# Patient Record
Sex: FEMALE | Race: BLACK OR AFRICAN AMERICAN | NOT HISPANIC OR LATINO | Employment: FULL TIME | URBAN - METROPOLITAN AREA
[De-identification: names, ages, dates, MRNs, and addresses within clinical notes are randomized per-mention and may not be internally consistent; named-entity substitution may affect disease eponyms.]

---

## 2017-01-17 ENCOUNTER — HOSPITAL ENCOUNTER (EMERGENCY)
Facility: HOSPITAL | Age: 42
Discharge: HOME | End: 2017-01-17
Attending: EMERGENCY MEDICINE
Payer: MEDICAID

## 2017-01-17 VITALS
OXYGEN SATURATION: 97 % | DIASTOLIC BLOOD PRESSURE: 79 MMHG | TEMPERATURE: 99 F | RESPIRATION RATE: 18 BRPM | HEIGHT: 59 IN | SYSTOLIC BLOOD PRESSURE: 113 MMHG | HEART RATE: 100 BPM | WEIGHT: 145 LBS | BODY MASS INDEX: 29.23 KG/M2

## 2017-01-17 DIAGNOSIS — J06.9 ACUTE URI: Primary | ICD-10-CM

## 2017-01-17 DIAGNOSIS — N39.0 ACUTE UTI: ICD-10-CM

## 2017-01-17 DIAGNOSIS — R50.9 ACUTE FEBRILE ILLNESS: ICD-10-CM

## 2017-01-17 DIAGNOSIS — R06.00 DYSPNEA: ICD-10-CM

## 2017-01-17 LAB
B-HCG UR QL: NEGATIVE
BACTERIA #/AREA URNS HPF: ABNORMAL /HPF
BILIRUB UR QL STRIP: NEGATIVE
CLARITY UR: CLEAR
COLOR UR: YELLOW
CTP QC/QA: YES
FLUAV AG SPEC QL IA: NEGATIVE
FLUBV AG SPEC QL IA: NEGATIVE
GLUCOSE UR QL STRIP: NEGATIVE
HGB UR QL STRIP: ABNORMAL
KETONES UR QL STRIP: ABNORMAL
LEUKOCYTE ESTERASE UR QL STRIP: NEGATIVE
MICROSCOPIC COMMENT: ABNORMAL
NITRITE UR QL STRIP: NEGATIVE
NON-SQ EPI CELLS #/AREA URNS HPF: 1 /HPF
PH UR STRIP: 6 [PH] (ref 5–8)
PROT UR QL STRIP: NEGATIVE
RBC #/AREA URNS HPF: 1 /HPF (ref 0–4)
SP GR UR STRIP: 1.02 (ref 1–1.03)
SPECIMEN SOURCE: NORMAL
SQUAMOUS #/AREA URNS HPF: 8 /HPF
URN SPEC COLLECT METH UR: ABNORMAL
UROBILINOGEN UR STRIP-ACNC: NEGATIVE EU/DL
YEAST URNS QL MICRO: ABNORMAL

## 2017-01-17 PROCEDURE — 87400 INFLUENZA A/B EACH AG IA: CPT | Mod: 59

## 2017-01-17 PROCEDURE — 81025 URINE PREGNANCY TEST: CPT | Performed by: EMERGENCY MEDICINE

## 2017-01-17 PROCEDURE — 81000 URINALYSIS NONAUTO W/SCOPE: CPT

## 2017-01-17 PROCEDURE — 25000003 PHARM REV CODE 250: Performed by: EMERGENCY MEDICINE

## 2017-01-17 PROCEDURE — 99284 EMERGENCY DEPT VISIT MOD MDM: CPT | Mod: 25

## 2017-01-17 RX ORDER — ACETAMINOPHEN 500 MG
1000 TABLET ORAL
Status: COMPLETED | OUTPATIENT
Start: 2017-01-17 | End: 2017-01-17

## 2017-01-17 RX ORDER — AMOXICILLIN AND CLAVULANATE POTASSIUM 875; 125 MG/1; MG/1
1 TABLET, FILM COATED ORAL 2 TIMES DAILY
Qty: 20 TABLET | Refills: 0 | Status: SHIPPED | OUTPATIENT
Start: 2017-01-17 | End: 2017-01-27

## 2017-01-17 RX ORDER — LEVETIRACETAM 250 MG/1
500 TABLET ORAL 2 TIMES DAILY
COMMUNITY

## 2017-01-17 RX ORDER — IBUPROFEN 600 MG/1
600 TABLET ORAL
Status: COMPLETED | OUTPATIENT
Start: 2017-01-17 | End: 2017-01-17

## 2017-01-17 RX ORDER — MELOXICAM 7.5 MG/1
7.5 TABLET ORAL 2 TIMES DAILY PRN
Qty: 20 TABLET | Refills: 0 | Status: SHIPPED | OUTPATIENT
Start: 2017-01-17 | End: 2023-09-18 | Stop reason: ALTCHOICE

## 2017-01-17 RX ORDER — METFORMIN HYDROCHLORIDE 500 MG/1
500 TABLET, FILM COATED, EXTENDED RELEASE ORAL
COMMUNITY

## 2017-01-17 RX ADMIN — IBUPROFEN 600 MG: 600 TABLET, FILM COATED ORAL at 08:01

## 2017-01-17 RX ADMIN — ACETAMINOPHEN 1000 MG: 500 TABLET ORAL at 08:01

## 2017-01-17 NOTE — ED AVS SNAPSHOT
[facility]  [place] [address]               Velmanuellance Cunningham   2017  7:23 PM   ED    Description:  Female : 1975   Department:  [facility]           Your Care was Coordinated By:     Provider Role From To    Fidencio Humphries MD Attending Provider [phone] --      Reason for Visit     Generalized Body Aches           Diagnoses this Visit        Comments    Acute URI    -  Primary     Dyspnea         Acute febrile illness         Acute UTI           ED Disposition     ED Disposition Condition Comment    Discharge             To Do List           Follow-up Information     Schedule an appointment as soon as possible for a visit with Primary care physician/[facility].       These Medications        Disp Refills Start End    meloxicam (MOBIC) 7.5 MG tablet 20 tablet 0 2017     Take 1 tablet (7.5 mg total) by mouth 2 (two) times daily as needed for Pain. - Oral    amoxicillin-clavulanate 875-125mg (AUGMENTIN) 875-125 mg per tablet 20 tablet 0 2017    Take 1 tablet by mouth 2 (two) times daily. - Oral      [facility]     [facility] -  Assistance  Registered nurses in the [facility] provide clinical advisement, health education, appointment booking, and other advisory services.  Call for this free service at [phone].             Medications           Message regarding Medications     Verify the changes and/or additions to your medication regime listed below are the same as discussed with your clinician today.  If any of these changes or additions are incorrect, please notify your healthcare provider.        START taking these NEW medications        Refills    meloxicam (MOBIC) 7.5 MG tablet 0    Sig: Take 1 tablet (7.5 mg total) by mouth 2 (two) times daily as needed for Pain.    Class: Print    Route: Oral    amoxicillin-clavulanate 875-125mg  "(AUGMENTIN) 875-125 mg per tablet 0    Sig: Take 1 tablet by mouth 2 (two) times daily.    Class: Print    Route: Oral      These medications were administered today        Dose Freq    acetaminophen tablet 1,000 mg 1,000 mg ED 1 Time    Sig: Take 2 tablets (1,000 mg total) by mouth ED 1 Time.    Class: Normal    Route: Oral    ibuprofen tablet 600 mg 600 mg ED 1 Time    Sig: Take 1 tablet (600 mg total) by mouth ED 1 Time.    Class: Normal    Route: Oral           Verify that the below list of medications is an accurate representation of the medications you are currently taking.  If none reported, the list may be blank. If incorrect, please contact your healthcare provider. Carry this list with you in case of emergency.           Current Medications     levetiracetam (KEPPRA) 250 MG Tab Take 500 mg by mouth 2 (two) times daily.    metformin (GLUMETZA) 500 MG (MOD) 24 hr tablet Take 500 mg by mouth daily with breakfast.    TOPIRAMATE (TOPAMAX ORAL) Take by mouth.    amoxicillin-clavulanate 875-125mg (AUGMENTIN) 875-125 mg per tablet Take 1 tablet by mouth 2 (two) times daily.    meloxicam (MOBIC) 7.5 MG tablet Take 1 tablet (7.5 mg total) by mouth 2 (two) times daily as needed for Pain.           Clinical Reference Information           Your Vitals Were     BP Pulse Temp Resp Height Weight    140/84 (BP Location: Left arm, Patient Position: Sitting) 112 100.4 °F (38 °C) (Oral) 18 4' 11" (1.499 m) 65.8 kg (145 lb)    Last Period SpO2 BMI          12/24/2016 (Exact Date) 99% 29.29 kg/m2        Allergies as of 1/17/2017     No Known Allergies      Immunizations Administered on Date of Encounter - 1/17/2017     None      ED Micro, Lab, POCT     Start Ordered       Status Ordering Provider    01/17/17 1946 01/17/17 1945  Influenza antigen Nasopharyngeal Swab  STAT      Final result     01/17/17 1946 01/17/17 1945    Once,   Status:  Canceled      Canceled     01/17/17 1946 01/17/17 1945  Urinalysis  STAT      Final " result     01/17/17 1946 01/17/17 1945  POCT urine pregnancy  Once      Final result     01/17/17 1945 01/17/17 1945  Urinalysis Microscopic  Once      Final result       ED Imaging Orders     Start Ordered       Status Ordering Provider    01/17/17 1946 01/17/17 1945  X-Ray Chest PA And Lateral  1 time imaging      Final result       Discharge References/Attachments     BLADDER INFECTION, FEMALE (ADULT) (ENGLISH)    URI, VIRAL, NO ABX (ADULT) (ENGLISH)    FEVER CONTROL (ADULT) (ENGLISH)      [facility] Sign-Up     Activating your [facility] account is as easy as 1-2-3!     1) Visit [email or web address], select Sign Up Now, enter this activation code and your date of birth, then select Next.  [email or web address]  Expires: 3/3/2017  9:45 PM      2) Create a username and password to use when you visit [facility] in the future and select a security question in case you lose your password and select Next.    3) Enter your e-mail address and click Sign Up!    Additional Information  If you have questions, please e-mail [email or web address] or call [phone] to talk to our [facility] staff. Remember, [facility] is NOT to be used for urgent needs. For medical emergencies, dial 911.          [facility] complies with applicable Federal civil rights laws and does not discriminate on the basis of race, color, national origin, age, disability, or sex.        Language Assistance Services     ATTENTION: Language assistance services are available, free of charge. Please call [phone].      ATENCIÓN: Si habla español, tiene a coello disposición servicios gratuitos de asistencia lingüística. Llame al [phone].     CHÚ Ý: N?u b?n nói Ti?ng Vi?t, có các d?ch v? h? tr? ngôn ng? mi?n phí dành cho b?n. G?i s? [phone].

## 2017-01-18 NOTE — ED TRIAGE NOTES
Pt with c/o frontal headache that began 2 days ago, body aches and chills that began this morning. Pt denies visual changes, denies dizziness. Pt also with c/o intermittent SOB.

## 2017-01-18 NOTE — ED PROVIDER NOTES
"Encounter Date: 1/17/2017    SCRIBE #1 NOTE: I, Reno Rowland, am scribing for, and in the presence of,  Fidencio Humphries MD. I have scribed the following portions of the note - Other sections scribed: HPI, ROS.       History     Chief Complaint   Patient presents with    Generalized Body Aches     "I don't know if I have the flu or a sinus infection or what. My whole body is hurting me and my head is hurting."     Review of patient's allergies indicates:  No Known Allergies  HPI Comments: CC: Generalized Body Aches    HPI: This 41 y.o female with a PMHx of DM presents to the ED c/o acute onset generalized body aches that started this morning. Pt also c/o 2 day hx of sinus pressure,  frontal headaches, rhinorrhea, congestion, and urinary frequency, a productive cough (yellow mucus), SOB. Pt has not received the flu vaccination this year. Pt denies sick contacts, chills, difficulty urinating.  No other symptoms reported.     The history is provided by the patient.     Past Medical History   Diagnosis Date    Diabetes mellitus     Epilepsy     Seizures      No past medical history pertinent negatives.  History reviewed. No pertinent past surgical history.  Family History   Problem Relation Age of Onset    Diabetes Mother     Diabetes Father      Social History   Substance Use Topics    Smoking status: Never Smoker    Smokeless tobacco: None    Alcohol use No     Review of Systems   Constitutional: Negative for chills and fever.   HENT: Positive for congestion, rhinorrhea and sinus pressure. Negative for sore throat.    Eyes: Negative for redness.   Respiratory: Positive for shortness of breath.    Cardiovascular: Negative for chest pain.   Gastrointestinal: Negative for diarrhea, nausea and vomiting.   Genitourinary: Positive for frequency. Negative for difficulty urinating and dysuria.   Musculoskeletal:        (+) generalized body aches   Skin: Negative for rash.   Neurological: Positive for headaches " (frontal). Negative for weakness.   Hematological: Does not bruise/bleed easily.       Physical Exam   Initial Vitals   BP Pulse Resp Temp SpO2   01/17/17 1920 01/17/17 1920 01/17/17 1920 01/17/17 1920 01/17/17 1920   140/84 112 18 100.4 °F (38 °C) 99 %     Physical Exam  Nursing note and vitals reviewed.  Constitutional: Nontoxic appearing female in no acute distress.  HENT:    Head: NC/AT    Eyes: Conjunctivae normal.  (-) scleral icterus.              Mouth/Throat: Erythematous oropharynx w/o exudates.  Uvula midline.   Neck: Neck supple, normal rom.  (-)stridor.  (-) cervical lymphadenopathy.   Cardiovascular: tachycardic, regular rhythm  Pulmonary/Chest: CTAB   Musculoskeletal: FROM of all major joints. No LE edema or calf tenderness.  Neurological: A&O x4.  No motor or sensory deficits.  Normal gait.  Skin: Skin is intact, warm and dry.   No rash, petechiae or purpura.  Psychiatric: normal mood and affect.      ED Course   Procedures  Labs Reviewed   URINALYSIS - Abnormal; Notable for the following:        Result Value    Ketones, UA Trace (*)     Occult Blood UA 1+ (*)     All other components within normal limits   URINALYSIS MICROSCOPIC - Abnormal; Notable for the following:     Bacteria, UA Moderate (*)     Yeast, UA Rare (*)     Non-Squam Epith 1 (*)     All other components within normal limits   INFLUENZA A AND B ANTIGEN   POCT URINE PREGNANCY        X-Rays:   Independently Interpreted Readings:   Chest X-Ray: Normal heart size.  No infiltrates.  No acute abnormalities.     Medical Decision Making:   History:   Old Medical Records: I decided to obtain old medical records.  Old Records Summarized: records from clinic visits and other records.  Independently Interpreted Test(s):   I have ordered and independently interpreted X-rays - see prior notes.  Clinical Tests:   Lab Tests: Ordered and Reviewed  Radiological Study: Ordered and Reviewed    Differential diagnosis:   Initial differential diagnosis  includes but is not limited to... URI, viral syndrome, pneumonia, influenza    Additional Medical Decision Makin-year-old female with history of diabetes presents emergency Department complaining of generalized body aches, sinus pressure/congestion, rhinorrhea, frontal headaches, productive cough and urinary frequency for the past 2 days.  VS significant for low-grade fever 100.4°F and mild tachycardia 112.  Chest x-ray reveals clear lung fields without obvious consolidation/pneumonia, pleural effusion or pneumothorax.  Rapid flu negative.  Urinalysis reveals moderate bacteria and rare yeast.  Findings at this time are most consistent with an acute URI/viral syndrome along with an incidental finding of an acute UTI.   Given her history of diabetes, she has been started on Augmentin.  Recommend close follow-up with PCP.  Return instructions discussed prior to discharge.            Scribe Attestation:   Scribe #1: I performed the above scribed service and the documentation accurately describes the services I performed. I attest to the accuracy of the note.    Attending Attestation:           Physician Attestation for Scribe:  Physician Attestation Statement for Scribe #1: I,  Fidencio Humphries MD, reviewed documentation, as scribed by Reno Rwoland in my presence, and it is both accurate and complete.                 ED Course     Clinical Impression:   The primary encounter diagnosis was Acute URI. Diagnoses of Dyspnea, Acute febrile illness, and Acute UTI were also pertinent to this visit.    Disposition:   Disposition: Discharged       Fidencio Humphries MD  17

## 2021-05-03 ENCOUNTER — HOSPITAL ENCOUNTER (EMERGENCY)
Facility: HOSPITAL | Age: 46
Discharge: HOME | End: 2021-05-04
Attending: EMERGENCY MEDICINE
Payer: MEDICAID

## 2021-05-03 DIAGNOSIS — M25.561 RIGHT KNEE PAIN, UNSPECIFIED CHRONICITY: Primary | ICD-10-CM

## 2021-05-03 DIAGNOSIS — M25.569 KNEE PAIN: ICD-10-CM

## 2021-05-03 DIAGNOSIS — M79.604 LEG PAIN, POSTERIOR, RIGHT: ICD-10-CM

## 2021-05-03 LAB
B-HCG UR QL: NEGATIVE
CTP QC/QA: YES

## 2021-05-03 PROCEDURE — 99285 EMERGENCY DEPT VISIT HI MDM: CPT | Mod: 25

## 2021-05-03 PROCEDURE — 63600175 PHARM REV CODE 636 W HCPCS: Performed by: EMERGENCY MEDICINE

## 2021-05-03 PROCEDURE — 81025 URINE PREGNANCY TEST: CPT | Performed by: EMERGENCY MEDICINE

## 2021-05-03 PROCEDURE — 96372 THER/PROPH/DIAG INJ SC/IM: CPT

## 2021-05-03 RX ORDER — KETOROLAC TROMETHAMINE 30 MG/ML
15 INJECTION, SOLUTION INTRAMUSCULAR; INTRAVENOUS
Status: COMPLETED | OUTPATIENT
Start: 2021-05-03 | End: 2021-05-03

## 2021-05-03 RX ADMIN — KETOROLAC TROMETHAMINE 15 MG: 30 INJECTION, SOLUTION INTRAMUSCULAR at 10:05

## 2021-05-04 VITALS
BODY MASS INDEX: 32.46 KG/M2 | OXYGEN SATURATION: 97 % | WEIGHT: 161 LBS | HEART RATE: 78 BPM | SYSTOLIC BLOOD PRESSURE: 149 MMHG | TEMPERATURE: 98 F | HEIGHT: 59 IN | DIASTOLIC BLOOD PRESSURE: 90 MMHG | RESPIRATION RATE: 17 BRPM

## 2022-07-08 ENCOUNTER — HOSPITAL ENCOUNTER (EMERGENCY)
Facility: HOSPITAL | Age: 47
Discharge: HOME | End: 2022-07-08
Attending: EMERGENCY MEDICINE
Payer: MEDICAID

## 2022-07-08 VITALS
RESPIRATION RATE: 20 BRPM | HEART RATE: 91 BPM | TEMPERATURE: 99 F | DIASTOLIC BLOOD PRESSURE: 91 MMHG | BODY MASS INDEX: 30.24 KG/M2 | SYSTOLIC BLOOD PRESSURE: 132 MMHG | HEIGHT: 59 IN | WEIGHT: 150 LBS | OXYGEN SATURATION: 98 %

## 2022-07-08 DIAGNOSIS — J20.9 ACUTE BRONCHITIS, UNSPECIFIED ORGANISM: Primary | ICD-10-CM

## 2022-07-08 DIAGNOSIS — R05.9 COUGH: ICD-10-CM

## 2022-07-08 LAB
B-HCG UR QL: NEGATIVE
CTP QC/QA: YES
POC MOLECULAR INFLUENZA A AGN: NEGATIVE
POC MOLECULAR INFLUENZA B AGN: NEGATIVE
SARS-COV-2 RDRP RESP QL NAA+PROBE: NEGATIVE

## 2022-07-08 PROCEDURE — 99284 EMERGENCY DEPT VISIT MOD MDM: CPT | Mod: 25,ER

## 2022-07-08 PROCEDURE — U0002 COVID-19 LAB TEST NON-CDC: HCPCS | Mod: ER | Performed by: NURSE PRACTITIONER

## 2022-07-08 PROCEDURE — 87502 INFLUENZA DNA AMP PROBE: CPT | Mod: ER

## 2022-07-08 PROCEDURE — 81025 URINE PREGNANCY TEST: CPT | Mod: ER | Performed by: EMERGENCY MEDICINE

## 2022-07-08 RX ORDER — PROMETHAZINE HYDROCHLORIDE AND DEXTROMETHORPHAN HYDROBROMIDE 6.25; 15 MG/5ML; MG/5ML
5 SYRUP ORAL NIGHTLY PRN
Qty: 118 ML | Refills: 0 | Status: SHIPPED | OUTPATIENT
Start: 2022-07-08 | End: 2022-07-18

## 2022-07-08 RX ORDER — ALBUTEROL SULFATE 90 UG/1
1-2 AEROSOL, METERED RESPIRATORY (INHALATION) EVERY 6 HOURS PRN
Qty: 8 G | Refills: 0 | Status: SHIPPED | OUTPATIENT
Start: 2022-07-08 | End: 2023-07-08

## 2022-07-08 RX ORDER — AZITHROMYCIN 250 MG/1
250 TABLET, FILM COATED ORAL DAILY
Qty: 6 TABLET | Refills: 0 | Status: SHIPPED | OUTPATIENT
Start: 2022-07-08

## 2022-07-08 RX ORDER — ACETAMINOPHEN 500 MG
1000 TABLET ORAL EVERY 6 HOURS PRN
Qty: 20 TABLET | Refills: 0 | Status: SHIPPED | OUTPATIENT
Start: 2022-07-08

## 2022-07-08 NOTE — FIRST PROVIDER EVALUATION
Emergency Department TeleTriage Encounter Note      CHIEF COMPLAINT    Chief Complaint   Patient presents with    Cough     Pt c/o cough, congestion and sore throat x3 days, worse at night.  Denies bodyaches, chills, N/V.  Resp EUL.  Speaking with hoarse voice.        VITAL SIGNS   Initial Vitals [07/08/22 1055]   BP Pulse Resp Temp SpO2   (!) 132/91 91 20 99 °F (37.2 °C) 98 %      MAP       --            ALLERGIES    Review of patient's allergies indicates:  No Known Allergies    PROVIDER TRIAGE NOTE  This is a teletriage evaluation of a 47 y.o. female presenting to the ED complaining of cough, nasal congestion, and sore throat for three days. Reports symptoms are worse at night. Denies CP, SOB, n/v/d.     Initial orders will be placed and care will be transferred to an alternate provider when patient is roomed for a full evaluation. Any additional orders and the final disposition will be determined by that provider.           ORDERS  Labs Reviewed   POCT URINE PREGNANCY       ED Orders (720h ago, onward)    Start Ordered     Status Ordering Provider    07/08/22 1133 07/08/22 1132  POCT COVID-19 Rapid Screening  Once         Ordered EDWARD ANDREWS N.    07/08/22 1133 07/08/22 1132  Airborne and Contact and Droplet Isolation Status  Continuous         Ordered EDWARD ANDREWS N.    07/08/22 1133 07/08/22 1132  POCT Influenza A/B Molecular  Once         Ordered EDWARD ANDREWS N.    07/08/22 1102 07/08/22 1101  POCT urine pregnancy  Once         Final result DORA LIVINGSTON            Virtual Visit Note: The provider triage portion of this emergency department evaluation and documentation was performed via [employer], a HIPAA-compliant telemedicine application, in concert with a tele-presenter in the room. A face to face patient evaluation with one of my colleagues will occur once the patient is placed in an emergency department room.      DISCLAIMER: This note was prepared with M*Modal  voice recognition transcription software. Garbled syntax, mangled pronouns, and other bizarre constructions may be attributed to that software system.

## 2022-07-08 NOTE — Clinical Note
"Telesha "Zac Cunningham was seen and treated in our emergency department on 7/8/2022.     COVID-19 is present in our communities across the state. There is limited testing for COVID at this time, so not all patients can be tested. In this situation, your employee meets the following criteria:    Morgan Cunningham has met the criteria for COVID-19 testing and has a NEGATIVE result. The employee can return to work once they are asymptomatic for 24 hours without the use of fever reducing medications (Tylenol, Motrin, etc).     If the employee is not fully vaccinated and had a close contact:  · Retest at 5 to 7 days post-exposure  · If possible, it is recommended that they quarantine for 5 days from the time of contact regardless of their test status.  · A mask should be worn post quarantine for 5 days.    If you have any questions or concerns, or if I can be of further assistance, please do not hesitate to contact me.    Sincerely,             Christine Bermudez NP"

## 2022-07-08 NOTE — DISCHARGE INSTRUCTIONS
You have been prescribed PROMETHAZINE DM for cough. Please do not take this medication while working, drinking alcohol, swimming, or while driving/operating heavy machinery. This medication may cause drowsiness, impair judgment, and reduce physical capabilities.    Please return to the Emergency Department for any new or worsening symptoms including: chest pain, shortness of breath, loss of consciousness, dizziness, weakness, or any other concerns.     Please follow up with your Primary Care Provider within in the week. If you do not have one, you may contact the one listed on your discharge paperwork or you may also call the [facility] Appointment Desk at [phone] to schedule an appointment with one.     Please take all medication as prescribed.

## 2022-07-08 NOTE — ED PROVIDER NOTES
Encounter Date: 7/8/2022    SCRIBE #1 NOTE: I, Davon Robison, am scribing for, and in the presence of,  Christine Bermudez NP. I have scribed the following portions of the note - Other sections scribed: HPIYGPSY.       History     Chief Complaint   Patient presents with    Cough     Pt c/o cough, congestion and sore throat x3 days, worse at night.  Denies bodyaches, chills, N/V.  Resp EUL.  Speaking with hoarse voice.      Patient is a 47 year old female with PMHx of DM who presents to ED with complaints of coughing and sore throat onset 3 days ago. She notes that it feels like when she was diagnosed with Bronchitis in the past. Coughing is fine during the day but worsens at night. Has not taken any medication for relief of symptoms. No known sick contact noted. She denies any tobacco use. Denies any associated fever, chills, congestion, or rhinorrhea. No other complaints at this time.     The history is provided by the patient. No  was used.     Review of patient's allergies indicates:  No Known Allergies  Past Medical History:   Diagnosis Date    Diabetes mellitus     Epilepsy     Seizures      Past Surgical History:   Procedure Laterality Date    cesarian       Family History   Problem Relation Age of Onset    Diabetes Mother     Diabetes Father      Social History     Tobacco Use    Smoking status: Never Smoker    Smokeless tobacco: Never Used   Substance Use Topics    Alcohol use: No    Drug use: No     Review of Systems   Constitutional: Negative for activity change, appetite change, chills, fatigue and fever.   HENT: Positive for sore throat. Negative for congestion, rhinorrhea, trouble swallowing and voice change.    Eyes: Negative for photophobia, pain, redness and visual disturbance.   Respiratory: Positive for cough. Negative for chest tightness, shortness of breath and wheezing.    Cardiovascular: Negative for chest pain, palpitations and leg swelling.   Gastrointestinal: Negative  for abdominal distention, abdominal pain, anal bleeding, constipation, diarrhea, nausea and vomiting.   Endocrine: Negative for polydipsia, polyphagia and polyuria.   Genitourinary: Negative for difficulty urinating, dysuria, frequency, hematuria, urgency, vaginal bleeding and vaginal discharge.   Musculoskeletal: Negative for arthralgias and myalgias.   Skin: Negative for rash and wound.   Neurological: Negative for dizziness, weakness, light-headedness and headaches.   Hematological: Negative for adenopathy.   All other systems reviewed and are negative.      Physical Exam     Initial Vitals [07/08/22 1055]   BP Pulse Resp Temp SpO2   (!) 132/91 91 20 99 °F (37.2 °C) 98 %      MAP       --         Physical Exam    Constitutional: She appears well-developed and well-nourished. She is not diaphoretic. No distress.   HENT:   Head: Normocephalic and atraumatic.   Right Ear: Hearing, tympanic membrane, external ear and ear canal normal.   Left Ear: Hearing, tympanic membrane, external ear and ear canal normal.   Nose: Mucosal edema and rhinorrhea present.   Mouth/Throat: Posterior oropharyngeal erythema present. No oropharyngeal exudate.   Eyes: Conjunctivae and EOM are normal. Pupils are equal, round, and reactive to light. Right eye exhibits no discharge. Left eye exhibits no discharge.   Neck: Neck supple.   Normal range of motion.  Cardiovascular: Normal rate, regular rhythm, normal heart sounds and intact distal pulses.   Pulmonary/Chest: Breath sounds normal. No respiratory distress.   Abdominal: Abdomen is soft. Bowel sounds are normal. There is no abdominal tenderness. No hernia. There is no rigidity, no rebound, no guarding, no tenderness at McBurney's point and negative Alba's sign.   Musculoskeletal:         General: Normal range of motion.      Cervical back: Normal range of motion and neck supple.     Neurological: She is alert and oriented to person, place, and time.   Skin: Skin is warm and dry.    Psychiatric: She has a normal mood and affect. Her behavior is normal.         ED Course   Procedures  Labs Reviewed   POCT URINE PREGNANCY   SARS-COV-2 RDRP GENE    Narrative:     This test utilizes isothermal nucleic acid amplification   technology to detect the SARS-CoV-2 RdRp nucleic acid segment.   The analytical sensitivity (limit of detection) is 125 genome   equivalents/mL.   A POSITIVE result implies infection with the SARS-CoV-2 virus;   the patient is presumed to be contagious.     A NEGATIVE result means that SARS-CoV-2 nucleic acids are not   present above the limit of detection. A NEGATIVE result should be   treated as presumptive. It does not rule out the possibility of   COVID-19 and should not be the sole basis for treatment decisions.   If COVID-19 is strongly suspected based on clinical and exposure   history, re-testing using an alternate molecular assay should be   considered.   This test is only for use under the Food and Drug   Administration s Emergency Use Authorization (EUA).   Commercial kits are provided by [employer].   Performance characteristics of the EUA have been independently   verified by [facility] Department of   Pathology and Laboratory Medicine.   _________________________________________________________________   The authorized Fact Sheet for Healthcare Providers and the authorized Fact   Sheet for Patients of the ID NOW COVID-19 are available on the FDA   website:     https://www.fda.gov/media/471720/download  https://www.fda.gov/media/851692/download          POCT INFLUENZA A/B MOLECULAR          Imaging Results          X-Ray Chest PA And Lateral (Final result)  Result time 07/08/22 13:50:22    Final result by Dorian Mcmanus MD (07/08/22 13:50:22)                 Impression:      1. Mild bilateral peribronchial thickening, early change of bronchial airway inflammation or infection consideration, correlation is advised.  No large focal  consolidation.      Electronically signed by: Dorian Mcmanus MD  Date:    07/08/2022  Time:    13:50             Narrative:    EXAMINATION:  XR CHEST PA AND LATERAL    CLINICAL HISTORY:  Cough, unspecified    TECHNIQUE:  PA and lateral views of the chest were performed.    COMPARISON:  01/17/2017    FINDINGS:  The cardiomediastinal silhouette is not enlarged.  There is no pleural effusion.  The trachea is midline.  The lungs are symmetrically expanded bilaterally with mild bilateral peribronchial thickening.  No large focal consolidation seen.  There is no pneumothorax.  The osseous structures are remarkable for dextroscoliotic curvature of the spine..                                 Medications - No data to display  Medical Decision Making:   History:   Old Medical Records: I decided to obtain old medical records.  Independently Interpreted Test(s):   I have ordered and independently interpreted X-rays - see prior notes.  Clinical Tests:   Lab Tests: Ordered and Reviewed  Radiological Study: Reviewed and Ordered  ED Management:  This is an evaluation of a 47 y.o. female that presents to the Emergency Department for cough and sore throat for 3 days. The patient is a non-toxic, afebrile, and well appearing female. On physical exam ears and pharynx are without evidence of infection. Appears well hydrated with moist mucus membranes. Neck soft and supple with no meningeal signs or cervical lymphadenopathy. Breath sounds are clear and equal bilaterally with no adventitious breath sounds, tachypnea or respiratory distress with room air pulse ox of 98% and no evidence of hypoxia.     Vital Signs Are Reassuring.  RESULTS:   UPT negative.  COVID negative.  Flu negative.  Chest x-ray with mild bilateral peribronchial thickening.  No large focal consolidation.    I will treat with azithromycin.  Will also discharge with symptomatic relief.    I considered, but at this time, do not suspect OM, OE, strep pharyngitis,  meningitis, pneumonia, or acute bacterial sinusitis.    The diagnosis, treatment plan, instructions for follow-up and reevaluation with PCP as well as ED return precautions were discussed and understanding was verbalized. All questions or concerns have been addressed.             Scribe Attestation:   Scribe #1: I performed the above scribed service and the documentation accurately describes the services I performed. I attest to the accuracy of the note.                 I, FRANCHESKA Bermudez, personally performed the services described in this documentation. All medical record entries made by the scribe were at my direction and in my presence. I have reviewed the chart and agree that the record reflects my personal performance and is accurate and complete.  Clinical Impression:   Final diagnoses:  [R05.9] Cough  [J20.9] Acute bronchitis, unspecified organism (Primary)          ED Disposition Condition    Discharge Stable        ED Prescriptions     Medication Sig Dispense Start Date End Date Auth. Provider    azithromycin (Z-JUMA) 250 MG tablet Take 1 tablet (250 mg total) by mouth once daily. Take first 2 tablets together, then 1 every day until finished. 6 tablet 7/8/2022  Christine Bermudez NP    albuterol (PROVENTIL/VENTOLIN HFA) 90 mcg/actuation inhaler Inhale 1-2 puffs into the lungs every 6 (six) hours as needed for Wheezing. Rescue 8 g 7/8/2022 7/8/2023 Christine Bermudez NP    promethazine-dextromethorphan (PROMETHAZINE-DM) 6.25-15 mg/5 mL Syrp Take 5 mLs by mouth nightly as needed (cough). 118 mL 7/8/2022 7/18/2022 Christine Bermudez NP    acetaminophen (TYLENOL) 500 MG tablet Take 2 tablets (1,000 mg total) by mouth every 6 (six) hours as needed for Pain or Temperature greater than (100.4). 20 tablet 7/8/2022  Christine Bermudez NP        Follow-up Information     Follow up With Specialties Details Why Contact Info    Princess LADAN Cole MD Internal Medicine, Pediatrics Schedule an appointment as soon as possible for  a visit  For follow-up [address]  [facility] [address]  [phone]      [facility] ED Emergency Medicine Go to  If symptoms worsen [address] [facility] [address] [phone]           Christine Bermudez NP  07/08/22 7570

## 2023-08-22 ENCOUNTER — HOSPITAL ENCOUNTER (EMERGENCY)
Facility: HOSPITAL | Age: 48
Discharge: HOME | End: 2023-08-22
Attending: EMERGENCY MEDICINE
Payer: MEDICAID

## 2023-08-22 VITALS
HEIGHT: 59 IN | RESPIRATION RATE: 18 BRPM | DIASTOLIC BLOOD PRESSURE: 85 MMHG | BODY MASS INDEX: 33.96 KG/M2 | SYSTOLIC BLOOD PRESSURE: 135 MMHG | HEART RATE: 94 BPM | WEIGHT: 168.44 LBS | OXYGEN SATURATION: 98 % | TEMPERATURE: 98 F

## 2023-08-22 DIAGNOSIS — N64.89 BREAST HEMATOMA: Primary | ICD-10-CM

## 2023-08-22 LAB
B-HCG UR QL: NEGATIVE
CTP QC/QA: YES

## 2023-08-22 PROCEDURE — 81025 URINE PREGNANCY TEST: CPT | Mod: ER | Performed by: EMERGENCY MEDICINE

## 2023-08-22 PROCEDURE — 81025 URINE PREGNANCY TEST: CPT | Mod: ER

## 2023-08-22 PROCEDURE — 99284 EMERGENCY DEPT VISIT MOD MDM: CPT | Mod: 25,ER

## 2023-08-22 RX ORDER — HYDROCODONE BITARTRATE AND ACETAMINOPHEN 5; 325 MG/1; MG/1
1 TABLET ORAL EVERY 6 HOURS PRN
Qty: 12 TABLET | Refills: 0 | Status: SHIPPED | OUTPATIENT
Start: 2023-08-22 | End: 2023-09-01

## 2023-08-22 RX ORDER — CLINDAMYCIN HYDROCHLORIDE 150 MG/1
300 CAPSULE ORAL EVERY 8 HOURS
Qty: 42 CAPSULE | Refills: 0 | Status: SHIPPED | OUTPATIENT
Start: 2023-08-22 | End: 2023-08-29

## 2023-08-22 NOTE — ED PROVIDER NOTES
"Encounter Date: 8/22/2023    SCRIBE #1 NOTE: I, Corona Lal, am scribing for, and in the presence of,  Cy Egan MD. I have scribed the following portions of the note - Other sections scribed: HPI, ROS, PE.       History     Chief Complaint   Patient presents with    Breast Pain     Pt reports she was pulling a doorknob off and it came back and hit her in the right breast. Pain and worsening swelling noted since Thursday to breast and nipple per pt.      Morgan Cunningham is a 48 y.o. female with a PMHx of DM, who presents to the ED for evaluation of intermittent right breast pain onset 5 days ago. Patient also c/o right breast swelling. Patient states she was at work when she attempted to open a door and the handle came off and hit her right breast. She describes the pain as "heavy and tender." Rates the pain 7/10. She has taken no medication for the pain. Denies color change.    The history is provided by the patient. No  was used.     Review of patient's allergies indicates:  No Known Allergies  Past Medical History:   Diagnosis Date    Diabetes mellitus     Epilepsy     Seizures      Past Surgical History:   Procedure Laterality Date    cesarian      LEG SURGERY Left     fluid removal in knee     Family History   Problem Relation Age of Onset    Diabetes Mother     Diabetes Father      Social History     Tobacco Use    Smoking status: Never    Smokeless tobacco: Never   Substance Use Topics    Alcohol use: No    Drug use: No     Review of Systems   Constitutional:  Negative for fever.   HENT:  Negative for sore throat.    Eyes:  Negative for visual disturbance.   Respiratory:  Negative for shortness of breath.    Cardiovascular:  Negative for chest pain.   Gastrointestinal:  Negative for abdominal pain.   Genitourinary:  Negative for dysuria.   Musculoskeletal:  Negative for back pain.        (+) breast pain (+) breast swelling   Skin:  Negative for color change and rash. "   Neurological:  Negative for headaches.       Physical Exam     Initial Vitals [08/22/23 1038]   BP Pulse Resp Temp SpO2   (!) 149/97 94 18 98.4 °F (36.9 °C) 97 %      MAP       --         Physical Exam    Nursing note and vitals reviewed.  Constitutional: She appears well-developed and well-nourished.   Eyes: EOM are normal. Pupils are equal, round, and reactive to light.   Neck: Neck supple. No thyromegaly present. No JVD present.   Normal range of motion.  Cardiovascular:  Normal rate, regular rhythm, normal heart sounds, intact distal pulses and normal pulses.     Exam reveals no gallop and no friction rub.       No murmur heard.  Pulmonary/Chest: Effort normal and breath sounds normal. No respiratory distress.   Pain and induration to the entire right areola about 6-7 cm. No drainage, erythema or ecchymosis.    Abdominal: Abdomen is soft. Bowel sounds are normal.   Musculoskeletal:         General: No tenderness or edema. Normal range of motion.      Cervical back: Normal range of motion and neck supple.     Neurological: She is alert and oriented to person, place, and time. She has normal strength.   Skin: Skin is warm and dry.         ED Course   Procedures  Labs Reviewed   POCT URINE PREGNANCY          Imaging Results              US Soft Tissue Chest_Upper Back (Final result)  Result time 08/22/23 12:36:05      Final result by Fabian Logan MD (08/22/23 12:36:05)                   Impression:      Right breast 08:00 o'clock axis oval heterogeneous fluid collection corresponds to part of the area of concern.  Given her history and clinical exam findings, this could possibly represent hematoma or abscess.    Adjacent to the fluid collection, there is also heterogeneous shadowing in the right breast 08:00 o'clock axis, indeterminate.    Recommend additional evaluation for these findings with bilateral diagnostic mammogram and limited right breast ultrasound in 1 week to ensure resolution.    BI-RADS 0:  Needs additional imaging.    Recommendation:    Bilateral diagnostic mammogram and limited right breast ultrasound.      Electronically signed by: Fabian Logan MD  Date:    08/22/2023  Time:    12:36               Narrative:    EXAMINATION:  US SOFT TISSUE CHEST_UPPER BACK    CLINICAL HISTORY:  Right breast trauma. Assess for fluid collection/hematoma.    TECHNIQUE:  Real-time grayscale and color Doppler sonography.    COMPARISON:  No prior breast imaging exams available for comparison.    FINDINGS:  In the right breast lower outer quadrant 08:00 o'clock axis, there is an oval heterogeneous fluid collection with internal debris measuring 1.9 x 1.3 x 1.4 cm.  This corresponds to the area of concern.  Associated posterior acoustic enhancement and rim hypervascularity.    Adjacent to the fluid collection and also within the right breast 08:00 o'clock axis, there is heterogeneous shadowing, indeterminate.                                       Medications - No data to display  Medical Decision Making  Amount and/or Complexity of Data Reviewed  Labs: ordered.  Radiology: ordered.    Risk  Prescription drug management.    Patient has firmness and induration and tenderness without erythema or warmth involving the right areola.  Proximally 5 6 cm in size.  This started after a trauma.  Patient pulled a latch that was on a door and broke off and her hand and the piece broke off came back and hit her in the chest.  Swelling and pain started after that.  There appears to be some hypervascularity to the area that could be consistent with hematoma or abscess.  However fluid collection is disorganized and small unlikely to be drainable by needle abscess per ultrasound tech.  Will treat with clindamycin as a precaution.  Will treat with pain.  Radiology recommends repeat ultrasound and mammogram in 1-2 weeks to ensure resolution and that there isn't something underlying.  Patient is overdue for her routine mammogram.         Scribe Attestation:   Scribe #1: I performed the above scribed service and the documentation accurately describes the services I performed. I attest to the accuracy of the note.            I, Cy Egan, personally performed the services described in this documentation. All medical record entries made by the scribe were at my direction and in my presence.  I have reviewed the chart and agree that the record reflects my personal performance and is accurate and complete          Medical Decision Making:   History:   Old Medical Records: I decided to obtain old medical records.  Clinical Tests:   Lab Tests: Ordered and Reviewed  Radiological Study: Reviewed and Ordered      Clinical Impression:   Final diagnoses:  [N64.89] Breast hematoma (Primary)        ED Disposition Condition    Discharge Stable          ED Prescriptions       Medication Sig Dispense Start Date End Date Auth. Provider    clindamycin (CLEOCIN) 150 MG capsule Take 2 capsules (300 mg total) by mouth every 8 (eight) hours. for 7 days 42 capsule 8/22/2023 8/29/2023 Cy Egan MD    HYDROcodone-acetaminophen (NORCO) 5-325 mg per tablet Take 1 tablet by mouth every 6 (six) hours as needed for Pain. 12 tablet 8/22/2023 9/1/2023 Cy Egan MD          Follow-up Information       Follow up With Specialties Details Why Contact Info    Princess LADAN Cole MD Internal Medicine, Pediatrics Call today recommend bilateral mamogram and repeat breast US one week to show resolution. [address]  [facility] [address]  [phone]               Cy Egan MD  08/24/23 5638

## 2023-08-22 NOTE — ED TRIAGE NOTES
Pt arrived to the ED via personal transport due to right breast pain. Pt report incident at work when accidentally pulled doorknob against chest wall. Pt has obvious redness and swelling. Alert and oriented x4.

## 2023-09-18 ENCOUNTER — HOSPITAL ENCOUNTER (EMERGENCY)
Facility: HOSPITAL | Age: 48
Discharge: HOME | End: 2023-09-18
Attending: STUDENT IN AN ORGANIZED HEALTH CARE EDUCATION/TRAINING PROGRAM
Payer: MEDICAID

## 2023-09-18 VITALS
TEMPERATURE: 98 F | WEIGHT: 166 LBS | SYSTOLIC BLOOD PRESSURE: 165 MMHG | OXYGEN SATURATION: 100 % | HEART RATE: 81 BPM | BODY MASS INDEX: 33.47 KG/M2 | RESPIRATION RATE: 18 BRPM | HEIGHT: 59 IN | DIASTOLIC BLOOD PRESSURE: 87 MMHG
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
## 2023-09-18 LAB
B-HCG UR QL: NEGATIVE
CTP QC/QA: YES

## 2023-09-18 PROCEDURE — 81025 URINE PREGNANCY TEST: CPT | Mod: ER

## 2023-09-18 PROCEDURE — 25000003 PHARM REV CODE 250: Mod: ER | Performed by: PHYSICIAN ASSISTANT

## 2023-09-18 PROCEDURE — 81025 URINE PREGNANCY TEST: CPT | Mod: ER | Performed by: STUDENT IN AN ORGANIZED HEALTH CARE EDUCATION/TRAINING PROGRAM

## 2023-09-18 PROCEDURE — 99284 EMERGENCY DEPT VISIT MOD MDM: CPT | Mod: 25,ER

## 2023-09-18 RX ORDER — ACETAMINOPHEN 500 MG
1000 TABLET ORAL
Status: COMPLETED | OUTPATIENT
Start: 2023-09-18 | End: 2023-09-18

## 2023-09-18 RX ORDER — METHOCARBAMOL 750 MG/1
1500 TABLET, FILM COATED ORAL 3 TIMES DAILY
Qty: 18 TABLET | Refills: 0 | Status: SHIPPED | OUTPATIENT
Start: 2023-09-18 | End: 2023-09-21

## 2023-09-18 RX ORDER — IBUPROFEN 600 MG/1
600 TABLET ORAL EVERY 6 HOURS PRN
Qty: 30 TABLET | Refills: 0 | Status: SHIPPED | OUTPATIENT
Start: 2023-09-18

## 2023-09-18 RX ADMIN — ACETAMINOPHEN 1000 MG: 500 TABLET ORAL at 06:09
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
## 2023-09-19 NOTE — DISCHARGE INSTRUCTIONS
Please take the prescribed medications according to the directions on the bottle.  Do not drive while taking Robaxin.  You may also use a heating pad 3 times a day for additional pain relief.  If your symptoms worsen you may return to the ED for reevaluation. Otherwise, please follow up with your primary care doctor within 1 week.